# Patient Record
Sex: FEMALE | Race: WHITE | URBAN - METROPOLITAN AREA
[De-identification: names, ages, dates, MRNs, and addresses within clinical notes are randomized per-mention and may not be internally consistent; named-entity substitution may affect disease eponyms.]

---

## 2022-08-05 ENCOUNTER — OFFICE VISIT (OUTPATIENT)
Dept: URBAN - METROPOLITAN AREA CLINIC 85 | Facility: CLINIC | Age: 67
End: 2022-08-05
Payer: MEDICARE

## 2022-08-05 DIAGNOSIS — H04.123 TEAR FILM INSUFFICIENCY OF BILATERAL LACRIMAL GLANDS: ICD-10-CM

## 2022-08-05 DIAGNOSIS — E05.01 THYROTOXICOSIS W DIFFUSE GOITER W THYROTOXIC CRISIS OR STORM: Primary | ICD-10-CM

## 2022-08-05 DIAGNOSIS — H05.243 CONSTANT EXOPHTHALMOS, BILATERAL: ICD-10-CM

## 2022-08-05 PROCEDURE — 92004 COMPRE OPH EXAM NEW PT 1/>: CPT | Performed by: OPHTHALMOLOGY

## 2022-08-05 ASSESSMENT — INTRAOCULAR PRESSURE
OD: 14
OS: 14

## 2022-08-05 ASSESSMENT — VISUAL ACUITY
OD: 20/20
OS: 20/20

## 2022-08-05 NOTE — IMPRESSION/PLAN
Impression: Tear film insufficiency of bilateral lacrimal glands: H04.123. Plan: Discussed dry eye diagnosis in detail. Recommend Systane Complete PF QID OU. Discussed prescription medication options such as Restasis and Dewaine Oven in the future. Also discussed potential of punctal plugs/punctal cautery.

## 2022-08-05 NOTE — IMPRESSION/PLAN
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 Plan: RTC 1 week for f/u and CVF 24-2   or ASAP if there should be any decrease in vision, pain, or any worsening of condition.

## 2022-08-26 ENCOUNTER — OFFICE VISIT (OUTPATIENT)
Dept: URBAN - METROPOLITAN AREA CLINIC 85 | Facility: CLINIC | Age: 67
End: 2022-08-26
Payer: MEDICARE

## 2022-08-26 DIAGNOSIS — H05.243 CONSTANT EXOPHTHALMOS, BILATERAL: ICD-10-CM

## 2022-08-26 DIAGNOSIS — H04.123 DRY EYE SYNDROME OF BILATERAL LACRIMAL GLANDS: ICD-10-CM

## 2022-08-26 DIAGNOSIS — E05.01 THYROTOXICOSIS WITH DIFFUSE GOITER WITH THYROTOXIC CRISIS OR STORM: Primary | ICD-10-CM

## 2022-08-26 DIAGNOSIS — H26.493 OTHER SECONDARY CATARACT, BILATERAL: ICD-10-CM

## 2022-08-26 PROCEDURE — 92083 EXTENDED VISUAL FIELD XM: CPT | Performed by: OPHTHALMOLOGY

## 2022-08-26 PROCEDURE — 92012 INTRM OPH EXAM EST PATIENT: CPT | Performed by: OPHTHALMOLOGY

## 2022-08-26 PROCEDURE — 92133 CPTRZD OPH DX IMG PST SGM ON: CPT | Performed by: OPHTHALMOLOGY

## 2022-08-26 ASSESSMENT — INTRAOCULAR PRESSURE
OS: 14
OD: 14

## 2022-08-26 NOTE — IMPRESSION/PLAN
Impression: Dry eye syndrome of bilateral lacrimal glands: H04.123. Plan: Discussed dry eye diagnosis in detail. Recommend Systane Complete QID OU. Discussed prescription medication options such as Restasis and Adam Shelter in the future. Also discussed potential of punctal plugs/punctal cautery.

## 2022-08-26 NOTE — IMPRESSION/PLAN
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 Plan: Discussed findings with patient. Recommend continuing follow up with Dr. Miguelangel Guidry and the specialist Dr. Miguelangel Guidry referred patient to. RTC here in 2-3 months for follow up with repeat 24-2 CVF.

## 2022-10-21 ENCOUNTER — OFFICE VISIT (OUTPATIENT)
Dept: URBAN - METROPOLITAN AREA CLINIC 85 | Facility: CLINIC | Age: 67
End: 2022-10-21
Payer: MEDICARE

## 2022-10-21 DIAGNOSIS — H05.243 CONSTANT EXOPHTHALMOS, BILATERAL: ICD-10-CM

## 2022-10-21 DIAGNOSIS — H26.493 OTHER SECONDARY CATARACT, BILATERAL: ICD-10-CM

## 2022-10-21 DIAGNOSIS — E05.01 THYROTOXICOSIS WITH DIFFUSE GOITER WITH THYROTOXIC CRISIS OR STORM: Primary | ICD-10-CM

## 2022-10-21 PROCEDURE — 92012 INTRM OPH EXAM EST PATIENT: CPT | Performed by: OPHTHALMOLOGY

## 2022-10-21 PROCEDURE — 92083 EXTENDED VISUAL FIELD XM: CPT | Performed by: OPHTHALMOLOGY

## 2022-10-21 ASSESSMENT — INTRAOCULAR PRESSURE
OS: 14
OD: 15

## 2022-10-21 NOTE — IMPRESSION/PLAN
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 Plan: Discussed findings with patient. Recommend continuing follow up with Dr. Judge Swenson and the specialist Dr. Judge Swenson referred patient to. Recommend [facility] therapy in [place] with RUB[facility] specialist.   RTC here in 1 month for follow up.

## 2022-12-12 ENCOUNTER — OFFICE VISIT (OUTPATIENT)
Dept: URBAN - METROPOLITAN AREA CLINIC 85 | Facility: CLINIC | Age: 67
End: 2022-12-12
Payer: MEDICARE

## 2022-12-12 DIAGNOSIS — H05.243 CONSTANT EXOPHTHALMOS, BILATERAL: ICD-10-CM

## 2022-12-12 DIAGNOSIS — E05.01 THYROTOXICOSIS WITH DIFFUSE GOITER WITH THYROTOXIC CRISIS OR STORM: Primary | ICD-10-CM

## 2022-12-12 PROCEDURE — 92012 INTRM OPH EXAM EST PATIENT: CPT | Performed by: OPHTHALMOLOGY

## 2022-12-12 ASSESSMENT — INTRAOCULAR PRESSURE
OS: 12
OD: 13

## 2022-12-12 NOTE — IMPRESSION/PLAN
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 Plan: Discussed findings with patient. Recommend continuing follow up with Dr. Dejah Bueno and the specialist Dr. Dejah Bueno referred patient to. Recommend [facility] therapy in [place] with [facility] specialist (Dr. Stef Minaya). RTC here in 2 weeks for follow up with possible 24-2 CVF.

## 2022-12-27 ENCOUNTER — OFFICE VISIT (OUTPATIENT)
Dept: URBAN - METROPOLITAN AREA CLINIC 85 | Facility: CLINIC | Age: 67
End: 2022-12-27
Payer: MEDICARE

## 2022-12-27 DIAGNOSIS — H05.243 CONSTANT EXOPHTHALMOS, BILATERAL: ICD-10-CM

## 2022-12-27 DIAGNOSIS — E05.01 THYROTOXICOSIS WITH DIFFUSE GOITER WITH THYROTOXIC CRISIS OR STORM: Primary | ICD-10-CM

## 2022-12-27 PROCEDURE — 92012 INTRM OPH EXAM EST PATIENT: CPT | Performed by: OPHTHALMOLOGY

## 2022-12-27 PROCEDURE — 92083 EXTENDED VISUAL FIELD XM: CPT | Performed by: OPHTHALMOLOGY

## 2022-12-27 ASSESSMENT — INTRAOCULAR PRESSURE
OD: 15
OS: 14

## 2022-12-27 NOTE — IMPRESSION/PLAN
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 Plan: Discussed findings with patient. Keep appt with Dr. Osiel Hewitt. RTC here in 2 months for follow up. ASAP if there should be any decrease in vision, pain, or any worsening of condition.

## 2023-03-06 ENCOUNTER — OFFICE VISIT (OUTPATIENT)
Dept: URBAN - METROPOLITAN AREA CLINIC 85 | Facility: CLINIC | Age: 68
End: 2023-03-06
Payer: MEDICARE

## 2023-03-06 DIAGNOSIS — E05.01 THYROTOXICOSIS W DIFFUSE GOITER W THYROTOXIC CRISIS OR STORM: Primary | ICD-10-CM

## 2023-03-06 PROCEDURE — 99212 OFFICE O/P EST SF 10 MIN: CPT | Performed by: OPHTHALMOLOGY

## 2023-03-06 ASSESSMENT — INTRAOCULAR PRESSURE
OS: 15
OD: 15

## 2023-03-06 NOTE — IMPRESSION/PLAN
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 Plan: Discussed findings with patient. Monitor.   Patient will be starting [facility] soon

## 2023-10-05 ENCOUNTER — OFFICE VISIT (OUTPATIENT)
Dept: URBAN - METROPOLITAN AREA CLINIC 85 | Facility: CLINIC | Age: 68
End: 2023-10-05
Payer: MEDICARE

## 2023-10-05 DIAGNOSIS — H52.4 PRESBYOPIA: ICD-10-CM

## 2023-10-05 DIAGNOSIS — H35.363 DRUSEN (DEGENERATIVE) OF MACULA, BILATERAL: Primary | ICD-10-CM

## 2023-10-05 DIAGNOSIS — E05.01 THYROTOXICOSIS WITH DIFFUSE GOITER WITH THYROTOXIC CRISIS OR STORM: ICD-10-CM

## 2023-10-05 DIAGNOSIS — H04.123 DRY EYE SYNDROME OF BILATERAL LACRIMAL GLANDS: ICD-10-CM

## 2023-10-05 PROCEDURE — 92134 CPTRZ OPH DX IMG PST SGM RTA: CPT | Performed by: OPTOMETRIST

## 2023-10-05 PROCEDURE — 92004 COMPRE OPH EXAM NEW PT 1/>: CPT | Performed by: OPTOMETRIST

## 2023-10-05 RX ORDER — METFORMIN HYDROCHLORIDE 500 MG/1
500 MG TABLET ORAL
Refills: 0 | Status: INACTIVE
Start: 2023-10-05 | End: 2023-10-05

## 2023-10-05 ASSESSMENT — VISUAL ACUITY
OD: 20/20
OS: 20/20

## 2023-10-05 ASSESSMENT — INTRAOCULAR PRESSURE
OS: 15
OD: 15